# Patient Record
Sex: FEMALE | URBAN - METROPOLITAN AREA
[De-identification: names, ages, dates, MRNs, and addresses within clinical notes are randomized per-mention and may not be internally consistent; named-entity substitution may affect disease eponyms.]

---

## 2020-10-12 ENCOUNTER — OFFICE VISIT (OUTPATIENT)
Dept: URBAN - METROPOLITAN AREA CLINIC 118 | Facility: CLINIC | Age: 60
End: 2020-10-12

## 2020-10-29 ENCOUNTER — OFFICE VISIT (OUTPATIENT)
Dept: URBAN - METROPOLITAN AREA SURGERY CENTER 23 | Facility: SURGERY CENTER | Age: 60
End: 2020-10-29

## 2022-06-14 ENCOUNTER — HOSPITAL ENCOUNTER (INPATIENT)
Dept: HOSPITAL 5 - ED | Age: 62
LOS: 3 days | Discharge: HOME | DRG: 101 | End: 2022-06-17
Attending: INTERNAL MEDICINE | Admitting: HOSPITALIST
Payer: COMMERCIAL

## 2022-06-14 DIAGNOSIS — E87.6: ICD-10-CM

## 2022-06-14 DIAGNOSIS — I16.1: ICD-10-CM

## 2022-06-14 DIAGNOSIS — M32.9: ICD-10-CM

## 2022-06-14 DIAGNOSIS — I10: ICD-10-CM

## 2022-06-14 DIAGNOSIS — G40.909: Primary | ICD-10-CM

## 2022-06-14 LAB
ALBUMIN SERPL-MCNC: 4.2 G/DL (ref 3.9–5)
ALT SERPL-CCNC: 8 UNITS/L (ref 7–56)
BASOPHILS # (AUTO): 0 K/MM3 (ref 0–0.1)
BASOPHILS NFR BLD AUTO: 0.2 % (ref 0–1.8)
BILIRUB UR QL STRIP: (no result)
BLOOD UR QL VISUAL: (no result)
BUN SERPL-MCNC: 15 MG/DL (ref 7–17)
BUN/CREAT SERPL: 21 %
CALCIUM SERPL-MCNC: 9.2 MG/DL (ref 8.4–10.2)
EOSINOPHIL # BLD AUTO: 0.1 K/MM3 (ref 0–0.4)
EOSINOPHIL NFR BLD AUTO: 0.8 % (ref 0–4.3)
HCT VFR BLD CALC: 34.1 % (ref 30.3–42.9)
HEMOLYSIS INDEX: 6
HGB BLD-MCNC: 11.6 GM/DL (ref 10.1–14.3)
HYALINE CASTS #/AREA URNS LPF: 2 /LPF
LYMPHOCYTES # BLD AUTO: 2.4 K/MM3 (ref 1.2–5.4)
LYMPHOCYTES NFR BLD AUTO: 17.8 % (ref 13.4–35)
MCHC RBC AUTO-ENTMCNC: 34 % (ref 30–34)
MCV RBC AUTO: 90 FL (ref 79–97)
MONOCYTES # (AUTO): 0.6 K/MM3 (ref 0–0.8)
MONOCYTES % (AUTO): 4.9 % (ref 0–7.3)
MUCOUS THREADS #/AREA URNS HPF: (no result) /HPF
PH UR STRIP: 7 [PH] (ref 5–7)
PLATELET # BLD: 272 K/MM3 (ref 140–440)
RBC # BLD AUTO: 3.82 M/MM3 (ref 3.65–5.03)
RBC #/AREA URNS HPF: 1 /HPF (ref 0–6)
UROBILINOGEN UR-MCNC: < 2 MG/DL (ref ?–2)
WBC #/AREA URNS HPF: 2 /HPF (ref 0–6)

## 2022-06-14 PROCEDURE — 80048 BASIC METABOLIC PNL TOTAL CA: CPT

## 2022-06-14 PROCEDURE — 83735 ASSAY OF MAGNESIUM: CPT

## 2022-06-14 PROCEDURE — 70450 CT HEAD/BRAIN W/O DYE: CPT

## 2022-06-14 PROCEDURE — 85025 COMPLETE CBC W/AUTO DIFF WBC: CPT

## 2022-06-14 PROCEDURE — 95819 EEG AWAKE AND ASLEEP: CPT

## 2022-06-14 PROCEDURE — G0480 DRUG TEST DEF 1-7 CLASSES: HCPCS

## 2022-06-14 PROCEDURE — 80053 COMPREHEN METABOLIC PANEL: CPT

## 2022-06-14 PROCEDURE — 80307 DRUG TEST PRSMV CHEM ANLYZR: CPT

## 2022-06-14 PROCEDURE — 93005 ELECTROCARDIOGRAM TRACING: CPT

## 2022-06-14 PROCEDURE — 36415 COLL VENOUS BLD VENIPUNCTURE: CPT

## 2022-06-14 PROCEDURE — 81001 URINALYSIS AUTO W/SCOPE: CPT

## 2022-06-14 PROCEDURE — 80320 DRUG SCREEN QUANTALCOHOLS: CPT

## 2022-06-14 RX ADMIN — LEVETIRACETAM SCH MLS/HR: 100 INJECTION, SOLUTION INTRAVENOUS at 23:45

## 2022-06-14 RX ADMIN — VALSARTAN SCH: 160 TABLET, FILM COATED ORAL at 22:00

## 2022-06-14 RX ADMIN — DEXTROSE AND SODIUM CHLORIDE SCH MLS/HR: 5; .45 INJECTION, SOLUTION INTRAVENOUS at 10:23

## 2022-06-14 RX ADMIN — HYDRALAZINE HYDROCHLORIDE PRN MG: 20 INJECTION INTRAMUSCULAR; INTRAVENOUS at 23:46

## 2022-06-14 RX ADMIN — VALSARTAN SCH MG: 160 TABLET, FILM COATED ORAL at 18:25

## 2022-06-14 RX ADMIN — FAMOTIDINE SCH MG: 10 INJECTION, SOLUTION INTRAVENOUS at 23:46

## 2022-06-14 RX ADMIN — DEXTROSE AND SODIUM CHLORIDE SCH MLS/HR: 5; .45 INJECTION, SOLUTION INTRAVENOUS at 23:45

## 2022-06-14 RX ADMIN — HYDRALAZINE HYDROCHLORIDE PRN MG: 20 INJECTION INTRAMUSCULAR; INTRAVENOUS at 17:10

## 2022-06-14 RX ADMIN — FAMOTIDINE SCH MG: 10 INJECTION, SOLUTION INTRAVENOUS at 10:29

## 2022-06-14 RX ADMIN — Medication SCH ML: at 23:39

## 2022-06-14 RX ADMIN — ONDANSETRON PRN MG: 2 INJECTION INTRAMUSCULAR; INTRAVENOUS at 18:07

## 2022-06-14 RX ADMIN — ONDANSETRON PRN MG: 2 INJECTION INTRAMUSCULAR; INTRAVENOUS at 09:32

## 2022-06-14 RX ADMIN — LEVETIRACETAM SCH MLS/HR: 100 INJECTION, SOLUTION INTRAVENOUS at 10:29

## 2022-06-14 RX ADMIN — ACETAMINOPHEN PRN MG: 325 TABLET ORAL at 09:32

## 2022-06-14 NOTE — HISTORY AND PHYSICAL REPORT
History of Present Illness


Date of examination: 06/14/22


Date of admission: 


06/14/22


Chief complaint: 





Seizure


History of present illness: 





61-year-old female with  history of hypertension and lupus and marijuana abuse 

was brought to the emergency room because of  new onset seizure.  Patient were 

lying in bed and patient had generalized shaking for approximately 5 to 10 

minutes.  EMS reports patient was postictal upon their arrival.  Patient became 

more responsive in route to the hospital.  She is currently drowsy with slow 

speech but oriented x3.  She does not recall EMS arrival to the home.  She 

complains of a mild headache but otherwise denies any symptoms currently or 

preceding symptoms.  She denies new medications, alcohol, or drug use.  She did 

not bite her tongue or urinate on herself during episode.  EMS Accu-Chek 95





In the emergency room initial CT scan of the head shows no acute intracranial 

abnormality.  Patient urine drug screen is positive for marijuana.  Subsequently

patient was seen and evaluated by telemetry neurology.








Past History


Past Medical History: hypertension, other (Lupus)


Past Surgical History: No surgical history


Social history: other (Marijuana abuse)


Family history: no significant family history





Medications and Allergies


                                    Allergies











Allergy/AdvReac Type Severity Reaction Status Date / Time


 


No Known Allergies Allergy   Unverified 06/14/22 02:13














Review of Systems


All systems: negative


Constitutional: malaise, lethargy, other (Seizure)





Exam





- Constitutional


Vitals: 


                                        











Temp Pulse Resp BP Pulse Ox


 


 98.5 F   95 H  16   179/105   99 


 


 06/14/22 03:21  06/14/22 03:21  06/14/22 03:21  06/14/22 03:21  06/14/22 03:21











General appearance: Present: no acute distress, well-nourished





- EENT


Eyes: Present: PERRL


ENT: hearing intact, clear oral mucosa





- Neck


Neck: Present: supple, normal ROM





- Respiratory


Respiratory effort: normal


Respiratory: bilateral: CTA





- Cardiovascular


Heart Sounds: Present: S1 & S2.  Absent: rub, click





- Extremities


Extremities: pulses symmetrical, No edema


Peripheral Pulses: within normal limits





- Abdominal


General gastrointestinal: Present: soft, non-tender, non-distended, normal bowel

sounds


Female genitourinary: Present: normal





- Integumentary


Integumentary: Present: clear, warm, dry





- Musculoskeletal


Musculoskeletal: gait normal, strength equal bilaterally





- Psychiatric


Psychiatric: appropriate mood/affect, intact judgment & insight





- Neurologic


Neurologic: CNII-XII intact, moves all extremities





Results





- Labs


CBC & Chem 7: 


                                 06/14/22 01:58





                                 06/14/22 01:58


Labs: 


                             Laboratory Last Values











WBC  13.2 K/mm3 (4.5-11.0)  H  06/14/22  01:58    


 


RBC  3.82 M/mm3 (3.65-5.03)   06/14/22  01:58    


 


Hgb  11.6 gm/dl (10.1-14.3)   06/14/22  01:58    


 


Hct  34.1 % (30.3-42.9)   06/14/22  01:58    


 


MCV  90 fl (79-97)   06/14/22  01:58    


 


MCH  30 pg (28-32)   06/14/22  01:58    


 


MCHC  34 % (30-34)   06/14/22  01:58    


 


RDW  13.8 % (13.2-15.2)   06/14/22  01:58    


 


Plt Count  272 K/mm3 (140-440)   06/14/22  01:58    


 


Lymph % (Auto)  17.8 % (13.4-35.0)   06/14/22  01:58    


 


Mono % (Auto)  4.9 % (0.0-7.3)   06/14/22  01:58    


 


Eos % (Auto)  0.8 % (0.0-4.3)   06/14/22  01:58    


 


Baso % (Auto)  0.2 % (0.0-1.8)   06/14/22  01:58    


 


Lymph # (Auto)  2.4 K/mm3 (1.2-5.4)   06/14/22  01:58    


 


Mono # (Auto)  0.6 K/mm3 (0.0-0.8)   06/14/22  01:58    


 


Eos # (Auto)  0.1 K/mm3 (0.0-0.4)   06/14/22  01:58    


 


Baso # (Auto)  0.0 K/mm3 (0.0-0.1)   06/14/22  01:58    


 


Seg Neutrophils %  76.3 % (40.0-70.0)  H  06/14/22  01:58    


 


Seg Neutrophils #  10.1 K/mm3 (1.8-7.7)  H  06/14/22  01:58    


 


Sodium  141 mmol/L (137-145)   06/14/22  01:58    


 


Potassium  3.0 mmol/L (3.6-5.0)  L  06/14/22  01:58    


 


Chloride  101.6 mmol/L ()   06/14/22  01:58    


 


Carbon Dioxide  23 mmol/L (22-30)   06/14/22  01:58    


 


Anion Gap  19 mmol/L  06/14/22  01:58    


 


BUN  15 mg/dL (7-17)   06/14/22  01:58    


 


Creatinine  0.7 mg/dL (0.6-1.2)   06/14/22  01:58    


 


Estimated GFR  > 60 ml/min  06/14/22  01:58    


 


BUN/Creatinine Ratio  21 %  06/14/22  01:58    


 


Glucose  127 mg/dL ()  H  06/14/22  01:58    


 


Calcium  9.2 mg/dL (8.4-10.2)   06/14/22  01:58    


 


Magnesium  1.80 mg/dL (1.7-2.3)   06/14/22  01:58    


 


Total Bilirubin  0.20 mg/dL (0.1-1.2)   06/14/22  01:58    


 


AST  31 units/L (5-40)   06/14/22  01:58    


 


ALT  8 units/L (7-56)   06/14/22  01:58    


 


Alkaline Phosphatase  79 units/L ()   06/14/22  01:58    


 


Total Protein  8.1 g/dL (6.3-8.2)   06/14/22  01:58    


 


Albumin  4.2 g/dL (3.9-5)   06/14/22  01:58    


 


Albumin/Globulin Ratio  1.1 %  06/14/22  01:58    


 


Urine Color  Straw  (Yellow)   06/14/22  01:46    


 


Urine Turbidity  Clear  (Clear)   06/14/22  01:46    


 


Urine pH  7.0  (5.0-7.0)   06/14/22  01:46    


 


Ur Specific Gravity  1.012  (1.003-1.030)   06/14/22  01:46    


 


Urine Protein  30 mg/dl mg/dL (Negative)   06/14/22  01:46    


 


Urine Glucose (UA)  Neg mg/dL (Negative)   06/14/22  01:46    


 


Urine Ketones  Neg mg/dL (Negative)   06/14/22  01:46    


 


Urine Blood  Neg  (Negative)   06/14/22  01:46    


 


Urine Nitrite  Neg  (Negative)   06/14/22  01:46    


 


Urine Bilirubin  Neg  (Negative)   06/14/22  01:46    


 


Urine Urobilinogen  < 2.0 mg/dL (<2.0)   06/14/22  01:46    


 


Ur Leukocyte Esterase  Sm  (Negative)   06/14/22  01:46    


 


Urine WBC (Auto)  2.0 /HPF (0.0-6.0)   06/14/22  01:46    


 


Urine RBC (Auto)  1.0 /HPF (0.0-6.0)   06/14/22  01:46    


 


U Epithel Cells (Auto)  7.0 /HPF (0-13.0)   06/14/22  01:46    


 


Hyaline Casts  2 /LPF  06/14/22  01:46    


 


Urine Mucus  Few /HPF  06/14/22  01:46    


 


Urine Opiates Screen  Negative   06/14/22  01:46    


 


Urine Methadone Screen  Negative   06/14/22  01:46    


 


Ur Barbiturates Screen  Negative   06/14/22  01:46    


 


Ur Phencyclidine Scrn  Negative   06/14/22  01:46    


 


Ur Amphetamines Screen  Negative   06/14/22  01:46    


 


U Benzodiazepines Scrn  Negative   06/14/22  01:46    


 


Urine Cocaine Screen  Negative   06/14/22  01:46    


 


U Marijuana (THC) Screen  Positive   06/14/22  01:46    


 


Drugs of Abuse Note  Disclamer   06/14/22  01:46    


 


Plasma/Serum Alcohol  < 0.01 % (0-0.07)   06/14/22  01:58    














- Imaging and Cardiology


CT Scan - head: report reviewed





Assessment and Plan


VTE prophylaxis?: Mechanical


Plan of care discussed with patient/family: Yes





- Patient Problems


(1) New onset seizure


Current Visit: Yes   Status: Acute   


Plan to address problem: 


Admit the patient to the medical telemetry.  NPO.  Seizure precaution.  Keppra 

500 mg IV every 12 hours.  EEG.  Neurology evaluation








(2) Hypertension


Current Visit: Yes   Status: Acute   


Plan to address problem: 


Hydralazine 10 mg IV every 6 hours as needed.  We continue the home medication








(3) Hypokalemia


Current Visit: Yes   Status: Acute   


Plan to address problem: 


Potassium is supplemented.  Recheck the BMP in the morning








(4) Lupus


Current Visit: Yes   Status: Acute   


Plan to address problem: 


Stable.  We will continue the home medication








(5) DVT prophylaxis


Current Visit: Yes   Status: Acute   


Plan to address problem: 


SCD for DVT prophylaxis.  Pepcid 20 mg IV every 12 hours for GI prophylaxis.  

Patient is a full code

## 2022-06-14 NOTE — CONSULTATION
History of Present Illness


Consult date: 06/14/22


Reason for Consult: Seizure


History of present illness: 





61-year-old female with  history of hypertension and lupus and marijuana abuse 

was brought to the emergency room because of  new onset seizure.  Patient were 

lying in bed and patient had generalized shaking for approximately 5 to 10 

minutes.  EMS reports patient was postictal upon their arrival.  Patient became 

more responsive in route to the hospital.  She is currently drowsy with slow 

speech but oriented x3.  She does not recall EMS arrival to the home.  She 

complains of a mild headache but otherwise denies any symptoms currently or 

preceding symptoms.  She denies new medications, alcohol, or drug use.  She did 

not bite her tongue or urinate on herself during episode.  EMS Accu-Chek 95





In the emergency room initial CT scan of the head shows no acute intracranial 

abnormality.  Patient urine drug screen is positive for marijuana.





Patient reports no complains now.





Past History


Past Medical History: hypertension, other (Lupus)


Past Surgical History: No surgical history


Social history: other (Marijuana abuse)


Family history: no significant family history





Medications and Allergies


                                    Allergies











Allergy/AdvReac Type Severity Reaction Status Date / Time


 


No Known Allergies Allergy   Unverified 06/14/22 02:13











                                Home Medications











 Medication  Instructions  Recorded  Confirmed  Last Taken  Type


 


Metoprolol [Lopressor TAB] 50 mg PO QDAY 06/14/22 06/14/22 Unknown History











Active Meds: 


Active Medications





Acetaminophen (Acetaminophen 325 Mg Tab)  650 mg PO Q4H PRN


   PRN Reason: Pain MILD(1-3)/Fever >100.5/HA


   Last Admin: 06/14/22 09:32 Dose:  650 mg


   


Albuterol (Albuterol 2.5 Mg/3 Ml Nebu)  2.5 mg IH Q3HRT PRN


   PRN Reason: Shortness Of Breath


Famotidine (Famotidine 20 Mg/2 Ml Inj)  20 mg IV BID ELISABETH


   Last Admin: 06/14/22 10:29 Dose:  20 mg


   


Dextrose/Sodium Chloride (D5/0.45ns)  1,000 mls @ 100 mls/hr IV AS DIRECT ELISABETH


   Last Admin: 06/14/22 10:23 Dose:  100 mls/hr


   


Levetiracetam 500 mg/ Dextrose  105 mls @ 400 mls/hr IV Q12HR ELISABETH


   Last Admin: 06/14/22 10:29 Dose:  400 mls/hr


   


Morphine Sulfate (Morphine 2 Mg/1 Ml Inj)  2 mg IV Q4H PRN


   PRN Reason: Pain, Moderate (4-6)


Morphine Sulfate (Morphine 4 Mg/1 Ml Inj)  4 mg IV Q4H PRN


   PRN Reason: Pain , Severe (7-10)


   Last Admin: 06/14/22 09:32 Dose:  4 mg


   


Ondansetron HCl (Ondansetron 4 Mg/2 Ml Inj)  4 mg IV Q8H PRN


   PRN Reason: Nausea And Vomiting


   Last Admin: 06/14/22 09:32 Dose:  4 mg


   


Sodium Chloride (Sodium Chloride 0.9% 10 Ml Flush Syringe)  10 ml IV BID ELISABETH


Sodium Chloride (Sodium Chloride 0.9% 10 Ml Flush Syringe)  10 ml IV PRN PRN


   PRN Reason: LINE FLUSH











Physical Examination





- Vital Signs


Vital Signs: 


                                   Vital Signs











Temp Pulse Resp BP Pulse Ox


 


 98.5 F   95 H  16   179/105   99 


 


 06/14/22 03:21  06/14/22 03:21  06/14/22 03:21  06/14/22 03:21  06/14/22 03:21














- Physical Exam


Narrative exam: 





The patient is alert , moves all 4 extremities , DTRs not done , no nystagmus . 

finger to nose is normal .





Results





- Laboratory Findings


CBC and BMP: 


                                 06/14/22 01:58





                                 06/14/22 01:58


Abnormal Lab Findings: 


                                  Abnormal Labs











  06/14/22 06/14/22





  01:58 01:58


 


WBC  13.2 H 


 


Seg Neutrophils %  76.3 H 


 


Seg Neutrophils #  10.1 H 


 


Potassium   3.0 L


 


Glucose   127 H














Assessment and Plan





1. New onset Seizure - needs further evaluation .


2. Headache Secondary to Seizure .


3. Head CT negative 








Plan :





1. MRI Brain no contrast 


2. Agree with Keppra 500mg 1 tab BID.


3. If possible  EEG in hospital 


4. No driving for 6 months .





Dr. Nails

## 2022-06-14 NOTE — EMERGENCY DEPARTMENT REPORT
ED Seizure HPI





- General


Stated Complaint: SEIZURE


Time Seen by Provider: 06/14/22 01:40


Source: patient, family, EMS


Mode of arrival: Stretcher


Limitations: No Limitations





- History of Present Illness


Initial Comments: 





61-year-old female with past medical history of hypertension and lupus presents 

to the hospital with possible new onset seizure.   at the bedside.  State

s that they were lying in bed and patient had generalized shaking for 

approximately 5 to 10 minutes.  EMS reports patient was postictal upon their 

arrival.  Patient became more responsive in route to the hospital.  She is 

currently drowsy with slow speech but oriented x3.  She does not recall EMS ar

rival to the home.  She complains of a mild headache but otherwise denies any 

symptoms currently or preceding symptoms.  She denies new medications, alcohol, 

or drug use.  She did not bite her tongue or urinate on herself during episode. 

EMS Accu-Chek 95





- Related Data


                                    Allergies











Allergy/AdvReac Type Severity Reaction Status Date / Time


 


No Known Allergies Allergy   Unverified 06/14/22 02:13














ED Review of Systems


ROS: 


Stated complaint: SEIZURE


Other details as noted in HPI





Comment: All other systems reviewed and negative





ED Physical Exam





- Other


Other exam information: 





General: No acute distress


Head: Atraumatic


Eyes: normal appearance


ENT: Moist mucous membranes


Neck: Normal appearance, no midline tenderness


Chest: Clear to auscultation bilaterally


CV: Regular rate and rhythm


Abdomen: Soft, normal bowel sounds, nontender, nondistended, no rebound or 

guarding


Back: Normal inspection


Extremity: Normal inspection, full range of motion


Neuro: Alert O x 3, no facial asymmetry, speech clear, equal handgrip and foot 

dorsiflexion


Psych: Appropriate behavior


Skin: No rash





ED Course


                                   Vital Signs











  06/14/22





  03:21


 


Temperature 98.5 F


 


Pulse Rate 95 H


 


Respiratory 16





Rate 


 


Blood Pressure 179/105





[Left] 


 


O2 Sat by Pulse 99





Oximetry 














- Consultations


Consultation #1: 





06/14/22 03:09


case d/w DR Dasilva tele neuro via phone, rec admission for MRI 





ED Medical Decision Making





- Lab Data


Result diagrams: 


                                 06/14/22 01:58





                                 06/14/22 01:58








                                   Lab Results











  06/14/22 06/14/22 06/14/22 Range/Units





  01:46 01:46 01:58 


 


WBC    13.2 H  (4.5-11.0)  K/mm3


 


RBC    3.82  (3.65-5.03)  M/mm3


 


Hgb    11.6  (10.1-14.3)  gm/dl


 


Hct    34.1  (30.3-42.9)  %


 


MCV    90  (79-97)  fl


 


MCH    30  (28-32)  pg


 


MCHC    34  (30-34)  %


 


RDW    13.8  (13.2-15.2)  %


 


Plt Count    272  (140-440)  K/mm3


 


Lymph % (Auto)    17.8  (13.4-35.0)  %


 


Mono % (Auto)    4.9  (0.0-7.3)  %


 


Eos % (Auto)    0.8  (0.0-4.3)  %


 


Baso % (Auto)    0.2  (0.0-1.8)  %


 


Lymph # (Auto)    2.4  (1.2-5.4)  K/mm3


 


Mono # (Auto)    0.6  (0.0-0.8)  K/mm3


 


Eos # (Auto)    0.1  (0.0-0.4)  K/mm3


 


Baso # (Auto)    0.0  (0.0-0.1)  K/mm3


 


Seg Neutrophils %    76.3 H  (40.0-70.0)  %


 


Seg Neutrophils #    10.1 H  (1.8-7.7)  K/mm3


 


Sodium     (137-145)  mmol/L


 


Potassium     (3.6-5.0)  mmol/L


 


Chloride     ()  mmol/L


 


Carbon Dioxide     (22-30)  mmol/L


 


Anion Gap     mmol/L


 


BUN     (7-17)  mg/dL


 


Creatinine     (0.6-1.2)  mg/dL


 


Estimated GFR     ml/min


 


BUN/Creatinine Ratio     %


 


Glucose     ()  mg/dL


 


Calcium     (8.4-10.2)  mg/dL


 


Magnesium     (1.7-2.3)  mg/dL


 


Total Bilirubin     (0.1-1.2)  mg/dL


 


AST     (5-40)  units/L


 


ALT     (7-56)  units/L


 


Alkaline Phosphatase     ()  units/L


 


Total Protein     (6.3-8.2)  g/dL


 


Albumin     (3.9-5)  g/dL


 


Albumin/Globulin Ratio     %


 


Urine Color  Straw    (Yellow)  


 


Urine Turbidity  Clear    (Clear)  


 


Urine pH  7.0    (5.0-7.0)  


 


Ur Specific Gravity  1.012    (1.003-1.030)  


 


Urine Protein  30 mg/dl    (Negative)  mg/dL


 


Urine Glucose (UA)  Neg    (Negative)  mg/dL


 


Urine Ketones  Neg    (Negative)  mg/dL


 


Urine Blood  Neg    (Negative)  


 


Urine Nitrite  Neg    (Negative)  


 


Urine Bilirubin  Neg    (Negative)  


 


Urine Urobilinogen  < 2.0    (<2.0)  mg/dL


 


Ur Leukocyte Esterase  Sm    (Negative)  


 


Urine WBC (Auto)  2.0    (0.0-6.0)  /HPF


 


Urine RBC (Auto)  1.0    (0.0-6.0)  /HPF


 


U Epithel Cells (Auto)  7.0    (0-13.0)  /HPF


 


Hyaline Casts  2    /LPF


 


Urine Mucus  Few    /HPF


 


Urine Opiates Screen   Negative   


 


Urine Methadone Screen   Negative   


 


Ur Barbiturates Screen   Negative   


 


Ur Phencyclidine Scrn   Negative   


 


Ur Amphetamines Screen   Negative   


 


U Benzodiazepines Scrn   Negative   


 


Urine Cocaine Screen   Negative   


 


Plasma/Serum Alcohol     (0-0.07)  %














  06/14/22 06/14/22 06/14/22 Range/Units





  01:58 01:58 01:58 


 


WBC     (4.5-11.0)  K/mm3


 


RBC     (3.65-5.03)  M/mm3


 


Hgb     (10.1-14.3)  gm/dl


 


Hct     (30.3-42.9)  %


 


MCV     (79-97)  fl


 


MCH     (28-32)  pg


 


MCHC     (30-34)  %


 


RDW     (13.2-15.2)  %


 


Plt Count     (140-440)  K/mm3


 


Lymph % (Auto)     (13.4-35.0)  %


 


Mono % (Auto)     (0.0-7.3)  %


 


Eos % (Auto)     (0.0-4.3)  %


 


Baso % (Auto)     (0.0-1.8)  %


 


Lymph # (Auto)     (1.2-5.4)  K/mm3


 


Mono # (Auto)     (0.0-0.8)  K/mm3


 


Eos # (Auto)     (0.0-0.4)  K/mm3


 


Baso # (Auto)     (0.0-0.1)  K/mm3


 


Seg Neutrophils %     (40.0-70.0)  %


 


Seg Neutrophils #     (1.8-7.7)  K/mm3


 


Sodium  141    (137-145)  mmol/L


 


Potassium  3.0 L    (3.6-5.0)  mmol/L


 


Chloride  101.6    ()  mmol/L


 


Carbon Dioxide  23    (22-30)  mmol/L


 


Anion Gap  19    mmol/L


 


BUN  15    (7-17)  mg/dL


 


Creatinine  0.7    (0.6-1.2)  mg/dL


 


Estimated GFR  > 60    ml/min


 


BUN/Creatinine Ratio  21    %


 


Glucose  127 H    ()  mg/dL


 


Calcium  9.2    (8.4-10.2)  mg/dL


 


Magnesium   1.80   (1.7-2.3)  mg/dL


 


Total Bilirubin  0.20    (0.1-1.2)  mg/dL


 


AST  31    (5-40)  units/L


 


ALT  8    (7-56)  units/L


 


Alkaline Phosphatase  79    ()  units/L


 


Total Protein  8.1    (6.3-8.2)  g/dL


 


Albumin  4.2    (3.9-5)  g/dL


 


Albumin/Globulin Ratio  1.1    %


 


Urine Color     (Yellow)  


 


Urine Turbidity     (Clear)  


 


Urine pH     (5.0-7.0)  


 


Ur Specific Gravity     (1.003-1.030)  


 


Urine Protein     (Negative)  mg/dL


 


Urine Glucose (UA)     (Negative)  mg/dL


 


Urine Ketones     (Negative)  mg/dL


 


Urine Blood     (Negative)  


 


Urine Nitrite     (Negative)  


 


Urine Bilirubin     (Negative)  


 


Urine Urobilinogen     (<2.0)  mg/dL


 


Ur Leukocyte Esterase     (Negative)  


 


Urine WBC (Auto)     (0.0-6.0)  /HPF


 


Urine RBC (Auto)     (0.0-6.0)  /HPF


 


U Epithel Cells (Auto)     (0-13.0)  /HPF


 


Hyaline Casts     /LPF


 


Urine Mucus     /HPF


 


Urine Opiates Screen     


 


Urine Methadone Screen     


 


Ur Barbiturates Screen     


 


Ur Phencyclidine Scrn     


 


Ur Amphetamines Screen     


 


U Benzodiazepines Scrn     


 


Urine Cocaine Screen     


 


Plasma/Serum Alcohol    < 0.01  (0-0.07)  %














- EKG Data


-: EKG Interpreted by Me


EKG shows normal: sinus rhythm, ST-T waves (lvh with repol)


Rate: normal (86)





- EKG Data


When compared to previous EKG there are: previous EKG unavailable





- Radiology Data


Radiology results: report reviewed





CT HEAD WITHOUT CONTRAST  


 


 INDICATION / CLINICAL INFORMATION: new onset seizure.  


 


 TECHNIQUE: CT head was performed without administration of intravenous 

contrast. All CT scans at 


this location are performed using CT dose reduction for ALARA by means of 

automated exposure 


control.   


 


 COMPARISON: None available.  


 


 FINDINGS:  


 CEREBRAL HEMISPHERES: There is no evidence of large territorial infarction or 

significant 


abnormality of gray-white matter differentiation. Ventricles within normal 

limits. No midline shift.


Basal cisterns patent.   


 HEMORRHAGE: None.  


 CEREBELLUM / BRAINSTEM: No significant abnormality.  


 


 


 ORBITS: No significant abnormality.  


 SOFT TISSUES: No significant abnormality.  


 SKULL: No significant abnormality.  


 PARANASAL SINUSES / MASTOID AIR CELLS: Normal as visualized.  


 


 ADDITIONAL FINDINGS: None.  


 


 


 IMPRESSION:  


 1. No acute intracranial abnormality.  


 





- Medical Decision Making





61-year-old female presents to the hospital with new onset seizure.  History of 

lupus.  ED work-up reveals normal CT head and mild hypokalemia.  P.o. potassium 

ordered.  Patient provided IV Keppra.  Given history of lupus she will be 

admitted to the hospital for further imaging/MRI brain to rule out acute 

pathology





- Differential Diagnosis


New onset seizure, intracranial abnormality, electrolyte abnl, arrhythmia


Critical Care Time: No


Critical care attestation.: 


If time is entered above; I have spent that time in minutes in the direct care 

of this critically ill patient, excluding procedure time.








ED Disposition


Clinical Impression: 


 New onset seizure, Lupus, Hypokalemia





Disposition: 09 ADMITTED AS INPATIENT


Is pt being admited?: Yes


Condition: Stable


Referrals: 


PRIMARY CARE,MD [Primary Care Provider] - 3-5 Days


Time of Disposition: 03:09

## 2022-06-14 NOTE — ELECTROCARDIOGRAPH REPORT
[facility]

                                       

Test Date:    2022               Test Time:    02:49:56

Pat Name:     ANAYA RODAS      Department:   

Patient ID:   [id number]          Room:         [place] [address]

Gender:       F                        Technician:   celestina

:          1960               Requested By: GILSON OTERO

Order Number: [id number]              Reading MD:   Francesco Luna

                                 Measurements

Intervals                              Axis          

Rate:         86                       P:            48

TX:           205                      QRS:          -45

QRSD:         88                       T:            82

QT:           381                                    

QTc:          453                                    

                           Interpretive Statements

Sinus rhythm

Atrial premature complex

Borderline prolonged TX interval

Probable left atrial enlargement

Left anterior fascicular block

LVH with secondary repolarization abnormality

No previous ECG available for comparison

Electronically Signed On 2022 10:06:59 EDT by Francesco Luna

## 2022-06-14 NOTE — CAT SCAN REPORT
CT HEAD WITHOUT CONTRAST



INDICATION / CLINICAL INFORMATION: new onset seizure.



TECHNIQUE: CT head was performed without administration of intravenous contrast. All CT scans at this
 location are performed using CT dose reduction for ALARA by means of automated exposure control. 



COMPARISON: None available.



FINDINGS:

CEREBRAL HEMISPHERES: There is no evidence of large territorial infarction or significant abnormality
 of gray-white matter differentiation. Ventricles within normal limits. No midline shift. Basal ciste
rns patent. 

HEMORRHAGE: None.

CEREBELLUM / BRAINSTEM: No significant abnormality.





ORBITS: No significant abnormality.

SOFT TISSUES: No significant abnormality.

SKULL: No significant abnormality.

PARANASAL SINUSES / MASTOID AIR CELLS: Normal as visualized.



ADDITIONAL FINDINGS: None.





IMPRESSION:

1. No acute intracranial abnormality.



Signer Name: Veto Ponce II, MD 

Signed: 6/14/2022 2:16 AM

Workstation Name: [id number]

## 2022-06-14 NOTE — EVENT NOTE
Date: 06/14/22


Chart reviewed and patient reevaluated


\Patient is stable


New onset seizures


Rule out CVA

## 2022-06-15 LAB
BASOPHILS # (AUTO): 0 K/MM3 (ref 0–0.1)
BASOPHILS NFR BLD AUTO: 0.5 % (ref 0–1.8)
BUN SERPL-MCNC: 8 MG/DL (ref 7–17)
BUN/CREAT SERPL: 16 %
CALCIUM SERPL-MCNC: 9.3 MG/DL (ref 8.4–10.2)
EOSINOPHIL # BLD AUTO: 0 K/MM3 (ref 0–0.4)
EOSINOPHIL NFR BLD AUTO: 0 % (ref 0–4.3)
HCT VFR BLD CALC: 37.8 % (ref 30.3–42.9)
HEMOLYSIS INDEX: 8
HGB BLD-MCNC: 12.6 GM/DL (ref 10.1–14.3)
LYMPHOCYTES # BLD AUTO: 1.1 K/MM3 (ref 1.2–5.4)
LYMPHOCYTES NFR BLD AUTO: 13.3 % (ref 13.4–35)
MCHC RBC AUTO-ENTMCNC: 33 % (ref 30–34)
MCV RBC AUTO: 90 FL (ref 79–97)
MONOCYTES # (AUTO): 0.4 K/MM3 (ref 0–0.8)
MONOCYTES % (AUTO): 4.8 % (ref 0–7.3)
PLATELET # BLD: 325 K/MM3 (ref 140–440)
RBC # BLD AUTO: 4.21 M/MM3 (ref 3.65–5.03)

## 2022-06-15 RX ADMIN — FAMOTIDINE SCH MG: 10 INJECTION, SOLUTION INTRAVENOUS at 09:43

## 2022-06-15 RX ADMIN — LEVETIRACETAM SCH MLS/HR: 100 INJECTION, SOLUTION INTRAVENOUS at 21:51

## 2022-06-15 RX ADMIN — Medication SCH ML: at 09:43

## 2022-06-15 RX ADMIN — ACETAMINOPHEN PRN MG: 325 TABLET ORAL at 09:47

## 2022-06-15 RX ADMIN — VALSARTAN SCH MG: 160 TABLET, FILM COATED ORAL at 20:45

## 2022-06-15 RX ADMIN — VALSARTAN SCH MG: 160 TABLET, FILM COATED ORAL at 09:43

## 2022-06-15 RX ADMIN — LEVETIRACETAM SCH MLS/HR: 100 INJECTION, SOLUTION INTRAVENOUS at 09:43

## 2022-06-15 RX ADMIN — DEXTROSE AND SODIUM CHLORIDE SCH MLS/HR: 5; .45 INJECTION, SOLUTION INTRAVENOUS at 16:59

## 2022-06-15 RX ADMIN — FAMOTIDINE SCH MG: 10 INJECTION, SOLUTION INTRAVENOUS at 20:46

## 2022-06-16 RX ADMIN — LEVETIRACETAM SCH MLS/HR: 100 INJECTION, SOLUTION INTRAVENOUS at 10:35

## 2022-06-16 RX ADMIN — DEXTROSE AND SODIUM CHLORIDE SCH MLS/HR: 5; .45 INJECTION, SOLUTION INTRAVENOUS at 10:35

## 2022-06-16 RX ADMIN — VALSARTAN SCH MG: 160 TABLET, FILM COATED ORAL at 21:56

## 2022-06-16 RX ADMIN — FAMOTIDINE SCH MG: 20 TABLET ORAL at 21:58

## 2022-06-16 RX ADMIN — FAMOTIDINE SCH MG: 20 TABLET ORAL at 10:30

## 2022-06-16 RX ADMIN — VALSARTAN SCH: 160 TABLET, FILM COATED ORAL at 06:21

## 2022-06-16 RX ADMIN — HYDRALAZINE HYDROCHLORIDE PRN MG: 20 INJECTION INTRAMUSCULAR; INTRAVENOUS at 14:20

## 2022-06-16 RX ADMIN — Medication SCH: at 20:14

## 2022-06-16 RX ADMIN — FAMOTIDINE SCH: 10 INJECTION, SOLUTION INTRAVENOUS at 06:21

## 2022-06-16 RX ADMIN — Medication SCH ML: at 10:39

## 2022-06-16 RX ADMIN — VALSARTAN SCH MG: 160 TABLET, FILM COATED ORAL at 10:38

## 2022-06-16 RX ADMIN — DEXTROSE AND SODIUM CHLORIDE SCH MLS/HR: 5; .45 INJECTION, SOLUTION INTRAVENOUS at 06:52

## 2022-06-16 RX ADMIN — Medication SCH ML: at 21:57

## 2022-06-16 RX ADMIN — HYDRALAZINE HYDROCHLORIDE PRN MG: 20 INJECTION INTRAMUSCULAR; INTRAVENOUS at 22:02

## 2022-06-16 NOTE — PROGRESS NOTE
Assessment and Plan





Assessment and Plan


VTE prophylaxis?: Mechanical


Plan of care discussed with patient/family: Yes





- Patient Problems


(1) New onset seizure


Current Visit: Yes   Status: Acute   


Plan to address problem: 


Admit the patient to the medical telemetry.  NPO.  Seizure precaution.  Keppra 

500 mg IV every 12 hours.  EEG.  Neurology evaluation








(2) Hypertensive emergency


Current Visit: Yes   Status: Acute   


Plan to address problem: 


Hydralazine 10 mg IV every 6 hours as needed.  We continue the home medication


 valsartan 160 twice daily and carvedilol 6.25 twice daily added





(3) Hypokalemia


Current Visit: Yes   Status: Acute   


Plan to address problem: 


Potassium is supplemented.  Recheck the BMP in the morning








(4) Lupus


Current Visit: Yes   Status: Acute   


Plan to address problem: 


Stable.  We will continue the home medication








(5) DVT prophylaxis


Current Visit: Yes   Status: Acute   


Plan to address problem: 


SCD for DVT prophylaxis.  Pepcid 20 mg IV every 12 hours for GI prophylaxis.  

Patient is a full code











Subjective


Date of service: 06/15/22


Principal diagnosis: Hypertensive emergency, seizure disorder


Interval history: 





61-year-old female with  history of hypertension and lupus and marijuana abuse 

was brought to the emergency room because of  new onset seizure.  Patient were 

lying in bed and patient had generalized shaking for approximately 5 to 10 

minutes.  EMS reports patient was postictal upon their arrival.  Patient became 

more responsive in route to the hospital.  She is currently drowsy with slow 

speech but oriented x3.  She does not recall EMS arrival to the home.  She 

complains of a mild headache but otherwise denies any symptoms currently or 

preceding symptoms.  She denies new medications, alcohol, or drug use.  She did 

not bite her tongue or urinate on herself during episode.  EMS Accu-Chek 95





In the emergency room initial CT scan of the head shows no acute intracranial 

abnormality.  Patient urine drug screen is positive for marijuana.  Subsequently

patient was seen and evaluated by telemetry neurology. 





 6/15/2021


No seizures 


Still blood pressure better controlled























Objective





- Constitutional


Vitals: 


                               Vital Signs - 12hr











  06/16/22 06/16/22 06/16/22





  06:00 08:27 09:05


 


Temperature 98.2 F 98.3 F 


 


Pulse Rate 76 80 


 


Respiratory 17 18 





Rate   


 


Blood Pressure  156/101 


 


Blood Pressure 151/95  





[Left]   


 


O2 Sat by Pulse 98 95 95





Oximetry   














  06/16/22 06/16/22 06/16/22





  10:38 10:39 14:20


 


Temperature   


 


Pulse Rate 82 82 79


 


Respiratory   





Rate   


 


Blood Pressure 156/98 156/98 170/109


 


Blood Pressure   





[Left]   


 


O2 Sat by Pulse   





Oximetry   











General appearance: Present: no acute distress, well-nourished





- EENT


Eyes: PERRL, EOM intact


ENT: hearing intact, clear oral mucosa


Ears: bilateral: normal





- Neck


Neck: supple, normal ROM





- Respiratory


Respiratory effort: normal


Respiratory: bilateral: CTA





- Breasts


Breasts: normal





- Cardiovascular


Rhythm: regular


Heart Sounds: Present: S1 & S2.  Absent: gallop, rub


Extremities: pulses intact, No edema, normal color, Full ROM





- Gastrointestinal


General gastrointestinal: Present: soft, non-tender, non-distended, normal bowel

 sounds





- Genitourinary


Female genitourinary: normal





- Integumentary


Integumentary: clear, warm, dry





- Musculoskeletal


Musculoskeletal: 1, strength equal bilaterally





- Neurologic


Neurologic: moves all extremities





- Psychiatric


Psychiatric: memory intact, appropriate mood/affect, intact judgment & insight





- Labs


CBC & Chem 7: 


                                 06/15/22 05:45





                                 06/15/22 05:45

## 2022-06-16 NOTE — DISCHARGE SUMMARY
Providers





- Providers


Date of Admission: 


06/14/22 05:21





Date of discharge: 06/16/22


Attending physician: 


PATSY ISLAS





                                        





06/14/22 05:26


Consult to Physician [CONS] Routine 


   Comment: 


   Consulting Provider: HAY HOOKS


   Physician Instructions: 


   Reason For Exam: seizure











Primary care physician: 


PRIMARY CARE MD








Hospitalization


Reason for admission: Can you can Continue (oxygen teaching advised


Condition: Stable


Hospital course: 


Subjective


Date of service: 06/16/22


Principal diagnosis: Hypertensive emergency, seizure disorder


Interval history: 





61-year-old female with  history of hypertension and lupus and marijuana abuse 

was brought to the emergency room because of  new onset seizure.  Patient were 

lying in bed and patient had generalized shaking for approximately 5 to 10 

minutes.  EMS reports patient was postictal upon their arrival.  Patient became 

more responsive in route to the hospital.  She is currently drowsy with slow 

speech but oriented x3.  She does not recall EMS arrival to the home.  She 

complains of a mild headache but otherwise denies any symptoms currently or 

preceding symptoms.  She denies new medications, alcohol, or drug use.  She did 

not bite her tongue or urinate on herself during episode.  EMS Accu-Chek 95





In the emergency room initial CT scan of the head shows no acute intracranial 

abnormality.  Patient urine drug screen is positive for marijuana.  Subsequently

 patient was seen and evaluated by telemetry neurology. 





 6/15/2021


No seizures 


Still blood pressure better controlled





 6/16/2022


No seizures


Blood pressure better controlled slightly high

















Assessment and Plan


VTE prophylaxis?: Mechanical


Plan of care discussed with patient/family: Yes





- Patient Problems


(1) New onset seizure


Current Visit: Yes   Status: Acute   


Plan to address problem: 


Admit the patient to the medical telemetry.  NPO.  Seizure precaution.  Keppra 

500 mg IV every 12 hours.  EEG.  Neurology evaluation








(2) Hypertensive emergency


Current Visit: Yes   Status: Acute   


Plan to address problem: 


Hydralazine 10 mg IV every 6 hours as needed.  We continue the home medication


 valsartan 160 twice daily and carvedilol 6.25 twice daily added





(3) Hypokalemia


Current Visit: Yes   Status: Acute   


Plan to address problem: 


Potassium is supplemented.  Recheck the BMP in the morning








(4) Lupus


Current Visit: Yes   Status: Acute   


Plan to address problem: 


Stable.  We will continue the home medication








(5) DVT prophylaxis


Current Visit: Yes   Status: Acute   


Plan to address problem: 


SCD for DVT prophylaxis.  Pepcid 20 mg IV every 12 hours for GI prophylaxis.  

Patient is a full code














Disposition: 01 HOME / SELF CARE / HOMELESS


Final Discharge Diagnosis (Prints w/discharge instructions): Hypertensive 

emergency.  New onset seizure disorder.  Hypokalemia





- Discharge Diagnoses


(1) Hypertension


Status: Acute   





(2) Hypokalemia


Status: Acute   





(3) Lupus


Status: Acute   





(4) New onset seizure


Status: Acute   





Core Measure Documentation





- Palliative Care


Palliative Care/ Comfort Measures: Not Applicable





- Core Measures


Any of the following diagnoses?: none





Exam





- Constitutional


Vitals: 


                                        











Temp Pulse Resp BP Pulse Ox


 


 98.0 F   79   18   151/90   98 


 


 06/16/22 15:55  06/16/22 14:20  06/16/22 08:27  06/16/22 15:55  06/16/22 10:41











General appearance: Present: no acute distress, well-nourished





- EENT


Eyes: Present: PERRL


ENT: hearing intact, clear oral mucosa





- Neck


Neck: Present: supple, normal ROM





- Respiratory


Respiratory effort: normal


Respiratory: bilateral: CTA





- Cardiovascular


Heart rate: 78


Rhythm: regular


Heart Sounds: Present: S1 & S2.  Absent: rub, click





- Extremities


Extremities: pulses symmetrical, No edema


Peripheral Pulses: within normal limits





- Abdominal


General gastrointestinal: Present: soft, non-tender, non-distended, normal bowel

 sounds


Female genitourinary: Present: normal





- Integumentary


Integumentary: Present: clear, warm, dry





- Musculoskeletal


Musculoskeletal: gait normal, strength equal bilaterally





- Psychiatric


Psychiatric: appropriate mood/affect, intact judgment & insight





- Neurologic


Neurologic: CNII-XII intact, moves all extremities





Plan


Activity: no restrictions


Diet: low cholesterol, low salt


Follow up with: 


PRIMARY CARE,MD [Primary Care Provider] - 3-5 Days


CHELSIE GUEVARA MD [Staff Physician] - 7 Days

## 2022-06-17 VITALS — DIASTOLIC BLOOD PRESSURE: 96 MMHG | SYSTOLIC BLOOD PRESSURE: 142 MMHG

## 2025-04-29 ENCOUNTER — DASHBOARD ENCOUNTERS (OUTPATIENT)
Age: 65
End: 2025-04-29

## 2025-04-29 ENCOUNTER — OFFICE VISIT (OUTPATIENT)
Dept: URBAN - METROPOLITAN AREA CLINIC 118 | Facility: CLINIC | Age: 65
End: 2025-04-29
Payer: COMMERCIAL

## 2025-04-29 ENCOUNTER — LAB OUTSIDE AN ENCOUNTER (OUTPATIENT)
Dept: URBAN - METROPOLITAN AREA CLINIC 118 | Facility: CLINIC | Age: 65
End: 2025-04-29

## 2025-04-29 DIAGNOSIS — Z12.11 SCREENING FOR COLON CANCER: ICD-10-CM

## 2025-04-29 PROCEDURE — 99202 OFFICE O/P NEW SF 15 MIN: CPT | Performed by: INTERNAL MEDICINE

## 2025-04-29 RX ORDER — DULOXETINE 60 MG/1
TAKE 1 CAPSULE BY MOUTH EVERY DAY FOR 90 DAYS CAPSULE, DELAYED RELEASE ORAL
Qty: 90 EACH | Refills: 0 | Status: ACTIVE | COMMUNITY

## 2025-04-29 RX ORDER — ATORVASTATIN CALCIUM 10 MG/1
1 TABLET AT NIGHT ORALLY DAILY 90 DAYS TABLET, FILM COATED ORAL
Qty: 90 EACH | Refills: 0 | Status: ACTIVE | COMMUNITY

## 2025-04-29 RX ORDER — NIFEDIPINE 90 MG/1
TAKE 1 TABLET BY MOUTH EVERY DAY ON EMPTY STOMACH TABLET, EXTENDED RELEASE ORAL
Qty: 90 EACH | Refills: 0 | Status: ACTIVE | COMMUNITY

## 2025-04-29 NOTE — HPI-TODAY'S VISIT:
Patient is a 63 y/o female with PMHx of HTN, seizures and HLD who presents today for colonoscopy consultation. The patient was referred and a copy of this documentation will be sent to the referring provider.  No prior colon. No known FH of CRC. Has a BM daily, denies constipation or diarrhea. Denies changes in bowel habits, GI bleeding, unintentional weight loss, NV or abdominal pain.

## 2025-05-30 ENCOUNTER — OFFICE VISIT (OUTPATIENT)
Dept: URBAN - METROPOLITAN AREA SURGERY CENTER 23 | Facility: SURGERY CENTER | Age: 65
End: 2025-05-30

## 2025-05-30 RX ORDER — NIFEDIPINE 90 MG/1
TAKE 1 TABLET BY MOUTH EVERY DAY ON EMPTY STOMACH TABLET, EXTENDED RELEASE ORAL
Qty: 90 EACH | Refills: 0 | Status: ACTIVE | COMMUNITY

## 2025-05-30 RX ORDER — ATORVASTATIN CALCIUM 10 MG/1
1 TABLET AT NIGHT ORALLY DAILY 90 DAYS TABLET, FILM COATED ORAL
Qty: 90 EACH | Refills: 0 | Status: ACTIVE | COMMUNITY

## 2025-05-30 RX ORDER — DULOXETINE 60 MG/1
TAKE 1 CAPSULE BY MOUTH EVERY DAY FOR 90 DAYS CAPSULE, DELAYED RELEASE ORAL
Qty: 90 EACH | Refills: 0 | Status: ACTIVE | COMMUNITY